# Patient Record
Sex: FEMALE | NOT HISPANIC OR LATINO | Employment: OTHER
[De-identification: names, ages, dates, MRNs, and addresses within clinical notes are randomized per-mention and may not be internally consistent; named-entity substitution may affect disease eponyms.]

---

## 2017-01-16 ENCOUNTER — RECORDS - HEALTHEAST (OUTPATIENT)
Dept: ADMINISTRATIVE | Facility: OTHER | Age: 47
End: 2017-01-16

## 2018-01-07 ENCOUNTER — HEALTH MAINTENANCE LETTER (OUTPATIENT)
Age: 48
End: 2018-01-07

## 2019-07-10 ENCOUNTER — RECORDS - HEALTHEAST (OUTPATIENT)
Dept: ADMINISTRATIVE | Facility: OTHER | Age: 49
End: 2019-07-10

## 2019-08-19 ENCOUNTER — COMMUNICATION - HEALTHEAST (OUTPATIENT)
Dept: FAMILY MEDICINE | Facility: CLINIC | Age: 49
End: 2019-08-19

## 2019-10-14 ENCOUNTER — COMMUNICATION - HEALTHEAST (OUTPATIENT)
Dept: FAMILY MEDICINE | Facility: CLINIC | Age: 49
End: 2019-10-14

## 2019-10-23 ENCOUNTER — COMMUNICATION - HEALTHEAST (OUTPATIENT)
Dept: FAMILY MEDICINE | Facility: CLINIC | Age: 49
End: 2019-10-23

## 2019-11-18 ENCOUNTER — OFFICE VISIT - HEALTHEAST (OUTPATIENT)
Dept: FAMILY MEDICINE | Facility: CLINIC | Age: 49
End: 2019-11-18

## 2019-11-18 ENCOUNTER — COMMUNICATION - HEALTHEAST (OUTPATIENT)
Dept: FAMILY MEDICINE | Facility: CLINIC | Age: 49
End: 2019-11-18

## 2019-11-18 DIAGNOSIS — R79.89 ELEVATED TSH: ICD-10-CM

## 2019-11-18 DIAGNOSIS — Z00.00 ROUTINE GENERAL MEDICAL EXAMINATION AT A HEALTH CARE FACILITY: ICD-10-CM

## 2019-11-18 DIAGNOSIS — Z12.11 SCREEN FOR COLON CANCER: ICD-10-CM

## 2019-11-18 DIAGNOSIS — Z12.31 VISIT FOR SCREENING MAMMOGRAM: ICD-10-CM

## 2019-11-18 LAB — TSH SERPL DL<=0.005 MIU/L-ACNC: 5.1 UIU/ML (ref 0.3–5)

## 2019-11-18 ASSESSMENT — MIFFLIN-ST. JEOR: SCORE: 1432.8

## 2019-11-19 LAB
HPV SOURCE: NORMAL
HUMAN PAPILLOMA VIRUS 16 DNA: NEGATIVE
HUMAN PAPILLOMA VIRUS 18 DNA: NEGATIVE
HUMAN PAPILLOMA VIRUS FINAL DIAGNOSIS: NORMAL
HUMAN PAPILLOMA VIRUS OTHER HR: NEGATIVE
SPECIMEN DESCRIPTION: NORMAL

## 2019-11-20 ENCOUNTER — COMMUNICATION - HEALTHEAST (OUTPATIENT)
Dept: FAMILY MEDICINE | Facility: CLINIC | Age: 49
End: 2019-11-20

## 2019-11-20 ENCOUNTER — AMBULATORY - HEALTHEAST (OUTPATIENT)
Dept: FAMILY MEDICINE | Facility: CLINIC | Age: 49
End: 2019-11-20

## 2019-11-20 DIAGNOSIS — E03.9 HYPOTHYROIDISM, UNSPECIFIED TYPE: ICD-10-CM

## 2019-11-27 ENCOUNTER — COMMUNICATION - HEALTHEAST (OUTPATIENT)
Dept: FAMILY MEDICINE | Facility: CLINIC | Age: 49
End: 2019-11-27

## 2019-11-27 LAB
BKR LAB AP ABNORMAL BLEEDING: NO
BKR LAB AP BIRTH CONTROL/HORMONES: ABNORMAL
BKR LAB AP CERVICAL APPEARANCE: ABNORMAL
BKR LAB AP GYN ADEQUACY: ABNORMAL
BKR LAB AP GYN INTERPRETATION: ABNORMAL
BKR LAB AP HPV REFLEX: ABNORMAL
BKR LAB AP LMP: ABNORMAL
BKR LAB AP PATIENT STATUS: ABNORMAL
BKR LAB AP PREVIOUS ABNORMAL: ABNORMAL
BKR LAB AP PREVIOUS NORMAL: ABNORMAL
HIGH RISK?: NO
PATH REPORT.COMMENTS IMP SPEC: ABNORMAL
RESULT FLAG (HE HISTORICAL CONVERSION): ABNORMAL

## 2019-12-23 ENCOUNTER — RECORDS - HEALTHEAST (OUTPATIENT)
Dept: ADMINISTRATIVE | Facility: OTHER | Age: 49
End: 2019-12-23

## 2019-12-27 ENCOUNTER — COMMUNICATION - HEALTHEAST (OUTPATIENT)
Dept: FAMILY MEDICINE | Facility: CLINIC | Age: 49
End: 2019-12-27

## 2019-12-27 DIAGNOSIS — E03.9 HYPOTHYROIDISM, UNSPECIFIED TYPE: ICD-10-CM

## 2020-01-02 ENCOUNTER — RECORDS - HEALTHEAST (OUTPATIENT)
Dept: ADMINISTRATIVE | Facility: OTHER | Age: 50
End: 2020-01-02

## 2020-01-06 ENCOUNTER — COMMUNICATION - HEALTHEAST (OUTPATIENT)
Dept: FAMILY MEDICINE | Facility: CLINIC | Age: 50
End: 2020-01-06

## 2020-01-06 DIAGNOSIS — E03.9 ACQUIRED HYPOTHYROIDISM: ICD-10-CM

## 2020-01-10 ENCOUNTER — COMMUNICATION - HEALTHEAST (OUTPATIENT)
Dept: FAMILY MEDICINE | Facility: CLINIC | Age: 50
End: 2020-01-10

## 2020-01-10 DIAGNOSIS — E03.9 HYPOTHYROIDISM, UNSPECIFIED TYPE: ICD-10-CM

## 2020-01-12 ENCOUNTER — COMMUNICATION - HEALTHEAST (OUTPATIENT)
Dept: FAMILY MEDICINE | Facility: CLINIC | Age: 50
End: 2020-01-12

## 2020-01-12 DIAGNOSIS — E03.9 HYPOTHYROIDISM, UNSPECIFIED TYPE: ICD-10-CM

## 2020-01-13 ENCOUNTER — AMBULATORY - HEALTHEAST (OUTPATIENT)
Dept: FAMILY MEDICINE | Facility: CLINIC | Age: 50
End: 2020-01-13

## 2020-01-13 DIAGNOSIS — E03.9 HYPOTHYROIDISM, UNSPECIFIED TYPE: ICD-10-CM

## 2020-01-30 ENCOUNTER — COMMUNICATION - HEALTHEAST (OUTPATIENT)
Dept: FAMILY MEDICINE | Facility: CLINIC | Age: 50
End: 2020-01-30

## 2020-02-24 ENCOUNTER — RECORDS - HEALTHEAST (OUTPATIENT)
Dept: ADMINISTRATIVE | Facility: OTHER | Age: 50
End: 2020-02-24

## 2020-02-25 ENCOUNTER — RECORDS - HEALTHEAST (OUTPATIENT)
Dept: ADMINISTRATIVE | Facility: OTHER | Age: 50
End: 2020-02-25

## 2020-03-02 ENCOUNTER — RECORDS - HEALTHEAST (OUTPATIENT)
Dept: ADMINISTRATIVE | Facility: OTHER | Age: 50
End: 2020-03-02

## 2020-03-10 ENCOUNTER — HEALTH MAINTENANCE LETTER (OUTPATIENT)
Age: 50
End: 2020-03-10

## 2020-03-12 ENCOUNTER — AMBULATORY - HEALTHEAST (OUTPATIENT)
Dept: FAMILY MEDICINE | Facility: CLINIC | Age: 50
End: 2020-03-12

## 2020-03-12 DIAGNOSIS — E03.9 HYPOTHYROIDISM, UNSPECIFIED TYPE: ICD-10-CM

## 2020-06-22 ENCOUNTER — COMMUNICATION - HEALTHEAST (OUTPATIENT)
Dept: FAMILY MEDICINE | Facility: CLINIC | Age: 50
End: 2020-06-22

## 2020-07-06 ENCOUNTER — RECORDS - HEALTHEAST (OUTPATIENT)
Dept: ADMINISTRATIVE | Facility: OTHER | Age: 50
End: 2020-07-06

## 2020-07-26 ENCOUNTER — COMMUNICATION - HEALTHEAST (OUTPATIENT)
Dept: FAMILY MEDICINE | Facility: CLINIC | Age: 50
End: 2020-07-26

## 2020-07-27 ENCOUNTER — AMBULATORY - HEALTHEAST (OUTPATIENT)
Dept: FAMILY MEDICINE | Facility: CLINIC | Age: 50
End: 2020-07-27

## 2020-07-27 DIAGNOSIS — T78.40XD ALLERGIC STATE, SUBSEQUENT ENCOUNTER: ICD-10-CM

## 2020-08-18 ENCOUNTER — COMMUNICATION - HEALTHEAST (OUTPATIENT)
Dept: FAMILY MEDICINE | Facility: CLINIC | Age: 50
End: 2020-08-18

## 2020-08-18 DIAGNOSIS — E03.9 ACQUIRED HYPOTHYROIDISM: ICD-10-CM

## 2020-12-27 ENCOUNTER — HEALTH MAINTENANCE LETTER (OUTPATIENT)
Age: 50
End: 2020-12-27

## 2021-04-04 ENCOUNTER — COMMUNICATION - HEALTHEAST (OUTPATIENT)
Dept: FAMILY MEDICINE | Facility: CLINIC | Age: 51
End: 2021-04-04

## 2021-04-04 DIAGNOSIS — E03.9 HYPOTHYROIDISM, UNSPECIFIED TYPE: ICD-10-CM

## 2021-04-07 ENCOUNTER — COMMUNICATION - HEALTHEAST (OUTPATIENT)
Dept: FAMILY MEDICINE | Facility: CLINIC | Age: 51
End: 2021-04-07

## 2021-04-07 DIAGNOSIS — T78.40XD ALLERGY, SUBSEQUENT ENCOUNTER: ICD-10-CM

## 2021-04-09 ENCOUNTER — COMMUNICATION - HEALTHEAST (OUTPATIENT)
Dept: FAMILY MEDICINE | Facility: CLINIC | Age: 51
End: 2021-04-09

## 2021-04-09 ENCOUNTER — OFFICE VISIT - HEALTHEAST (OUTPATIENT)
Dept: FAMILY MEDICINE | Facility: CLINIC | Age: 51
End: 2021-04-09

## 2021-04-09 DIAGNOSIS — E03.9 ACQUIRED HYPOTHYROIDISM: ICD-10-CM

## 2021-04-09 DIAGNOSIS — J30.2 SEASONAL ALLERGIC RHINITIS, UNSPECIFIED TRIGGER: ICD-10-CM

## 2021-04-09 DIAGNOSIS — E03.9 HYPOTHYROIDISM, UNSPECIFIED TYPE: ICD-10-CM

## 2021-04-09 RX ORDER — CETIRIZINE HYDROCHLORIDE, PSEUDOEPHEDRINE HYDROCHLORIDE 5; 120 MG/1; MG/1
1 TABLET, FILM COATED, EXTENDED RELEASE ORAL 2 TIMES DAILY
Qty: 180 TABLET | Refills: 1 | Status: SHIPPED | OUTPATIENT
Start: 2021-04-09 | End: 2021-07-12

## 2021-04-24 ENCOUNTER — HEALTH MAINTENANCE LETTER (OUTPATIENT)
Age: 51
End: 2021-04-24

## 2021-05-01 ENCOUNTER — COMMUNICATION - HEALTHEAST (OUTPATIENT)
Dept: FAMILY MEDICINE | Facility: CLINIC | Age: 51
End: 2021-05-01

## 2021-05-01 DIAGNOSIS — E03.9 HYPOTHYROIDISM, UNSPECIFIED TYPE: ICD-10-CM

## 2021-05-03 RX ORDER — LEVOTHYROXINE SODIUM 75 UG/1
TABLET ORAL
Qty: 30 TABLET | Refills: 0 | Status: SHIPPED | OUTPATIENT
Start: 2021-05-03 | End: 2021-07-29

## 2021-05-31 ENCOUNTER — RECORDS - HEALTHEAST (OUTPATIENT)
Dept: ADMINISTRATIVE | Facility: CLINIC | Age: 51
End: 2021-05-31

## 2021-05-31 NOTE — TELEPHONE ENCOUNTER
This is okay; longstanding patient of mine; we can't go over physical limit in a day, though, thanks

## 2021-05-31 NOTE — TELEPHONE ENCOUNTER
New Appointment Needed  What is the reason for the visit:    The patient would like to get reestablished with Dr. Estes.  The patient needs her yearly physical and has some thyroid concerns.  Provider Preference: PCP only  How soon do you need to be seen?: as soon as possible  Waitlist offered?: No  Okay to leave a detailed message:  Yes

## 2021-05-31 NOTE — TELEPHONE ENCOUNTER
Patient informed of clinician's message. No further questions.   Will call back to make appointment.

## 2021-06-03 NOTE — PROGRESS NOTES
ASSESSMENT:  1. Routine general medical examination at a health care facility     - Gynecologic Cytology (PAP Smear)    2. Visit for screening mammogram   will have completed at local hospital     3. Screen for colon cancer     - Ambulatory referral for Colonoscopy    4. Elevated TSH     - Thyroid Stimulating Hormone (TSH)           PLAN:  There are no Patient Instructions on file for this visit.    Orders Placed This Encounter   Procedures     Thyroid Stimulating Hormone (TSH)     Ambulatory referral for Colonoscopy     Referral Priority:   Routine     Referral Type:   Colonoscopy     Referral Reason:   Evaluation and Treatment     Referral Location:   [facility]     Requested Specialty:   Gastroenterology     Number of Visits Requested:   1     Medications Discontinued During This Encounter   Medication Reason     MULTIVITAMIN WITH MINERALS (HAIR,SKIN AND NAILS ORAL) Therapy completed       Return in about 1 year (around 11/18/2020) for Annual physical.    Health Maintenance Due   Topic Date Due     HIV SCREENING  04/14/1984     ADVANCE CARE PLANNING  04/14/1987     MAMMOGRAM  07/14/2016     PREVENTIVE CARE VISIT  05/20/2017     COLONOSCOPY  04/14/2019     INFLUENZA VACCINE RULE BASED (1) 08/01/2019     ZOSTER VACCINES (1 of 2) 04/14/2019       CHIEF COMPLAINT:  Chief Complaint   Patient presents with     Annual Exam     has labs done - hypothyroidism      Establish Care       HISTORY OF PRESENT ILLNESS:  Melissa D Butenschoen is a 49 y.o. female presenting to the clinic today for a physical exam.     She has not been seen for a few years and is due for a Pap smear.  She has had several Pap smears that were atypical squamous cells of undetermined significance but HPV negative.  She tells me that she had a colposcopy and 2015 maybe 2016 but I do not have those records.  She has had endometrial cells noted but she is also had her period at the time we are doing Pap smears.    She is due for her  "colonoscopies we will put the referral in for that    She is due for a mammogram and she will get that her local hospital.    She had some fasting labs completed through work she brought in a copy of those today and her TSH was elevated.  The labs were drawn July 10, 2019.  She is also complaining of some unexplained weight gain.    She lives with her son who is a cassius in high school.  She is  from his father and they still coparent.    Her review of systems is otherwise unremarkable.    Healthy Habits  Are you taking a daily aspirin? No  Do you typically exercising at least 40 min, 3-4 times per week?  Yes  Do you usually eat at least 4 servings of fruit and vegetables a day, include whole grains and fiber and avoid regularly eating high fat foods? Yes  Have you had an eye exam in the past two years? Yes  Do you see a dentist twice per year? Yes  Do you have any concerns regarding sleep? No  Do you drink caffeine? YES    Safety Screen  If you own firearms, are they secured in a locked gun cabinet or with trigger locks? The patient does not own any firearms    REVIEW OF SYSTEMS:   All other systems are negative.    Immunization History   Administered Date(s) Administered     Hep A, historic 2007, 2010     Hep B, historic 2007, 2007, 2010     Influenza, inj, historic,unspecified 2002     Tdap 2016       GYNECOLOGIC HISTORY:  Last menstrual period: 10/18/19  Contraception: abstinence  Last Pap: 2016 Results were: abnormal  Last mammogram: 2015  Results were: normal    OB History        2    Para   1    Term   1       0    AB   1    Living   1       SAB   1    TAB   0    Ectopic   0    Multiple   0    Live Births               Obstetric Comments   \"Demetris\"             PFSH:     Social History     Tobacco Use   Smoking Status Never Smoker   Smokeless Tobacco Never Used     No family history on file.  Social History     Socioeconomic History     " "Marital status:      Spouse name:       Number of children: 1     Years of education: None     Highest education level: None   Occupational History     Occupation:      Employer: SELF EMPLOYED   Social Needs     Financial resource strain: None     Food insecurity:     Worry: None     Inability: None     Transportation needs:     Medical: None     Non-medical: None   Tobacco Use     Smoking status: Never Smoker     Smokeless tobacco: Never Used   Substance and Sexual Activity     Alcohol use: Yes     Comment: occasional     Drug use: No     Sexual activity: Not Currently   Lifestyle     Physical activity:     Days per week: None     Minutes per session: None     Stress: None   Relationships     Social connections:     Talks on phone: None     Gets together: None     Attends Zoroastrian service: None     Active member of club or organization: None     Attends meetings of clubs or organizations: None     Relationship status: None     Intimate partner violence:     Fear of current or ex partner: None     Emotionally abused: None     Physically abused: None     Forced sexual activity: None   Other Topics Concern     None   Social History Narrative     None     No past surgical history on file.  Allergies   Allergen Reactions     Cat Dander Rash     Dog Dander Rash     Mold Other (See Comments)     Stuffy, running nose     Active Ambulatory Problems     Diagnosis Date Noted     No Active Ambulatory Problems     Resolved Ambulatory Problems     Diagnosis Date Noted     No Resolved Ambulatory Problems     No Additional Past Medical History       VITALS:  Vitals:    11/18/19 1330   BP: 142/82   Pulse: 80   SpO2: 95%   Weight: 188 lb 3.2 oz (85.4 kg)   Height: 5' 3\" (1.6 m)     BP Readings from Last 3 Encounters:   11/18/19 142/82   05/20/16 126/74   04/27/15 122/64     Wt Readings from Last 3 Encounters:   11/18/19 188 lb 3.2 oz (85.4 kg)   05/20/16 170 lb 12.8 oz (77.5 kg)   04/27/15 168 lb (76.2 kg)     Body " mass index is 33.34 kg/m .    PHYSICAL EXAM:  General Appearance: Alert, cooperative, no distress, appears stated age, overweight for height  Head: Normocephalic, without obvious abnormality, atraumatic  Eyes: PERRL, conjunctiva/corneas clear, EOM's intact  Ears: Normal TM's and external ear canals, both ears  Nose: Nares normal, septum midline,mucosa normal, no drainage  Throat: Lips, mucosa, and tongue normal; teeth and gums normal  Neck: Supple, symmetrical, trachea midline, no adenopathy;  thyroid: not enlarged, symmetric, no tenderness/mass/nodules   Back: Symmetric, no curvature, ROM normal, no CVA tenderness  Lungs: Clear to auscultation bilaterally, respirations unlabored  Breasts: No breast masses, tenderness, asymmetry, or nipple discharge.  Heart: Regular rate and rhythm, S1 and S2 normal, no murmur, rub, or gallop, Abdomen: Soft, non-tender, bowel sounds active all four quadrants,  no masses, no organomegaly  Pelvic:Normally developed genitalia with no external lesions or eruptions. Vagina and cervix show no lesions, inflammation, discharge or tenderness. No cystocele, No rectocele. Uterus nontender, but appears to be slightly enlarged to the right.  No adnexal mass or tenderness.    Extremities: Extremities normal, atraumatic, no cyanosis or edema  Skin: Skin color, texture, turgor normal, no rashes or lesions  Lymph nodes: Cervical, supraclavicular, and axillary nodes normal  Neurologic: Normal       QUALITY MEASURES:  The following high BMI interventions were performed this visit: encouragement to exercise, weight monitoring and dietary needs education     MEDICATIONS:  No current outpatient medications on file.     No current facility-administered medications for this visit.

## 2021-06-04 VITALS
OXYGEN SATURATION: 95 % | HEIGHT: 63 IN | WEIGHT: 188.2 LBS | SYSTOLIC BLOOD PRESSURE: 142 MMHG | HEART RATE: 80 BPM | DIASTOLIC BLOOD PRESSURE: 82 MMHG | BODY MASS INDEX: 33.35 KG/M2

## 2021-06-04 NOTE — TELEPHONE ENCOUNTER
RN cannot approve Refill Request    RN can NOT refill this medication PCP to review SIG. Last office visit: Visit date not found Last Physical: 11/18/2019 Last MTM visit: Visit date not found Last visit same specialty: Visit date not found.  Next visit within 3 mo: Visit date not found  Next physical within 3 mo: Visit date not found      Marita Fabian, [facility] Triage/Med Refill 12/28/2019    Requested Prescriptions   Pending Prescriptions Disp Refills     levothyroxine (SYNTHROID, LEVOTHROID) 25 MCG tablet [Pharmacy Med Name: Levothyroxine Sodium Oral Tablet 25 MCG] 60 tablet 0     Sig: take 1 tablet by mouth daily X 14 days, then 2 tablets daily as directed       Thyroid Hormones Protocol Passed - 12/27/2019  8:27 AM        Passed - Provider visit in past 12 months or next 3 months     Last office visit with prescriber/PCP: Visit date not found OR same dept: Visit date not found OR same specialty: Visit date not found  Last physical: 11/18/2019 Last MTM visit: Visit date not found   Next visit within 3 mo: Visit date not found  Next physical within 3 mo: Visit date not found  Prescriber OR PCP: Ema Estes MD  Last diagnosis associated with med order: 1. Hypothyroidism, unspecified type  - levothyroxine (SYNTHROID, LEVOTHROID) 25 MCG tablet [Pharmacy Med Name: Levothyroxine Sodium Oral Tablet 25 MCG]; take 1 tablet by mouth daily X 14 days, then 2 tablets daily as directed  Dispense: 60 tablet; Refill: 0    If protocol passes may refill for 12 months if within 3 months of last provider visit (or a total of 15 months).             Passed - TSH on file in past 12 months for patient age 12 & older     TSH   Date Value Ref Range Status   11/18/2019 5.10 (H) 0.30 - 5.00 uIU/mL Final

## 2021-06-04 NOTE — TELEPHONE ENCOUNTER
Unable to leave voicemail. If patient calls back please relay message below.    Please remind her that she is overdue for lab

## 2021-06-05 NOTE — TELEPHONE ENCOUNTER
Refill Approved    Rx renewed per Medication Renewal Policy. Medication was last renewed on 19.    Galina Patterson, [facility] Connection Triage/Med Refill 1/10/2020     Requested Prescriptions   Pending Prescriptions Disp Refills     levothyroxine (SYNTHROID, LEVOTHROID) 50 MCG tablet 30 tablet 0     Si tablet po daily as directed       Thyroid Hormones Protocol Passed - 1/10/2020  2:36 PM        Passed - Provider visit in past 12 months or next 3 months     Last office visit with prescriber/PCP: Visit date not found OR same dept: Visit date not found OR same specialty: Visit date not found  Last physical: 2019 Last MTM visit: Visit date not found   Next visit within 3 mo: Visit date not found  Next physical within 3 mo: Visit date not found  Prescriber OR PCP: Ema Estes MD  Last diagnosis associated with med order: 1. Hypothyroidism, unspecified type  - levothyroxine (SYNTHROID, LEVOTHROID) 50 MCG tablet; 1 tablet po daily as directed  Dispense: 30 tablet; Refill: 0    If protocol passes may refill for 12 months if within 3 months of last provider visit (or a total of 15 months).             Passed - TSH on file in past 12 months for patient age 12 & older     TSH   Date Value Ref Range Status   2019 5.10 (H) 0.30 - 5.00 uIU/mL Final

## 2021-06-10 NOTE — TELEPHONE ENCOUNTER
Medication Question or Clarification  Who is calling: patient   What medication are you calling about (include dose and sig)?:   levothyroxine (SYNTHROID, LEVOTHROID) 75 MCG tablet  90 tablet  3  3/12/2020      Sig - Route: Take 1 tablet (75 mcg total) by mouth Daily at 6:00 am. - Oral     Sent to pharmacy as: levothyroxine 75 mcg tablet (SYNTHROID, LEVOTHROID)         Who prescribed the medication?: Ema Estes MD    What is your question/concern?: I am on vacation for a week and I forgot my thyroid medication. Please send a 7 day supply to [facility] pharmacy in [place], [place]  Requested Pharmacy: [facility] pharmacy in [place], [place]  Okay to leave a detailed message?: Yes

## 2021-06-15 ENCOUNTER — TRANSFERRED RECORDS (OUTPATIENT)
Dept: HEALTH INFORMATION MANAGEMENT | Facility: CLINIC | Age: 51
End: 2021-06-15

## 2021-06-16 PROBLEM — E03.9 ACQUIRED HYPOTHYROIDISM: Status: ACTIVE | Noted: 2020-01-06

## 2021-06-16 NOTE — TELEPHONE ENCOUNTER
Controlled Substance Refill Request  Medication Name:   Requested Prescriptions     Pending Prescriptions Disp Refills     ZYRTEC-D 5-120 mg per tablet [Pharmacy Med Name: ZyrTEC-D Allergy & Congestion Oral Tablet Extended Release 12 Hour 5-120 MG] 180 tablet 0     Sig: TAKE ONE TABLET BY MOUTH TWICE DAILY     Date Last Fill: 7/27/20  [facility]: [facility]  Submit electronically to pharmacy  Controlled Substance Agreement on file:   Encounter-Level CSA Scan Date:    There are no encounter-level csa scan date.        Last office visit:  11/18/19 7/27/20

## 2021-06-16 NOTE — PROGRESS NOTES
Melissa D Butenschoen is a 50 y.o. female who is being evaluated via a billable telephone visit.      What phone number would you like to be contacted at? [phone]  How would you like to obtain your AVS? AVS Preference: Erin.    Assessment & Plan     Acquired hypothyroidism       Seasonal allergic rhinitis, unspecified trigger     - cetirizine-pseudoephedrine (ZYRTEC-D) 5-120 mg per tablet; Take 1 tablet by mouth 2 (two) times a day.    Hypothyroidism, unspecified type     - levothyroxine (SYNTHROID, LEVOTHROID) 75 MCG tablet; TAKE ONE TABLET BY MOUTH ONE TIME DAILY at 6:00 am     Ema Estes MD  [facility]   Melissa D Butenschoen is 50 y.o. and presents today for the following health issues     She has some pretty significant allergies and she thinks that in the last year when she is been working from home they might have actually gotten worse.  She doesn't know if she is allergic to their dog or what else is going on.  She is had a lot of sneezing.  She needs a prescription of Zyrtec-D so that she can get enough to last otherwise she doesn't have enough for a month    She also had some weight gain and doesn't know if it's the pandemic or decreased activity or that her thyroid isn't adequately replaced and she can't get in to have her physical until July so I will send an order in her chart for fasting labs for lipids and comprehensive metabolic panel also do the TSH and free T4 to see if she is on an appropriate dose of thyroid and a vitamin D level.  When she comes in in July we can talk about these further.    I'll refill her levothyroxine until she comes in.  She hasn't been seen since November 2019 so she is very overdue for any sort of health care but we'll get her a bridging prescription until she can come in.  HPI        Review of Systems         Objective       Vitals:  No vitals were obtained today due to virtual visit.    Physical Exam            Phone call duration: 15 minutes    Ema Estes MD

## 2021-06-16 NOTE — TELEPHONE ENCOUNTER
RN cannot approve Refill Request    RN can NOT refill this medication Protocol failed and NO refill given. Last office visit: Visit date not found Last Physical: 11/18/2019 Last MTM visit: Visit date not found Last visit same specialty: Visit date not found.  Next visit within 3 mo: Visit date not found  Next physical within 3 mo: Visit date not found      Marita Fabian, [facility] Triage/Med Refill 4/4/2021    Requested Prescriptions   Pending Prescriptions Disp Refills     levothyroxine (SYNTHROID, LEVOTHROID) 75 MCG tablet [Pharmacy Med Name: Levothyroxine Sodium Oral Tablet 75 MCG] 90 tablet 0     Sig: TAKE ONE TABLET BY MOUTH ONE TIME DAILY at 6:00 am       Thyroid Hormones Protocol Failed - 4/4/2021 12:06 PM        Failed - Provider visit in past 12 months or next 3 months     Last office visit with prescriber/PCP: Visit date not found OR same dept: Visit date not found OR same specialty: Visit date not found  Last physical: 11/18/2019 Last MTM visit: Visit date not found   Next visit within 3 mo: Visit date not found  Next physical within 3 mo: Visit date not found  Prescriber OR PCP: Ema Estes MD  Last diagnosis associated with med order: 1. Hypothyroidism, unspecified type  - levothyroxine (SYNTHROID, LEVOTHROID) 75 MCG tablet [Pharmacy Med Name: Levothyroxine Sodium Oral Tablet 75 MCG]; TAKE ONE TABLET BY MOUTH ONE TIME DAILY at 6:00 am  Dispense: 90 tablet; Refill: 0    If protocol passes may refill for 12 months if within 3 months of last provider visit (or a total of 15 months).             Failed - TSH on file in past 12 months for patient age 12 & older     TSH   Date Value Ref Range Status   11/18/2019 5.10 (H) 0.30 - 5.00 uIU/mL Final

## 2021-06-17 NOTE — TELEPHONE ENCOUNTER
RN cannot approve Refill Request    RN can NOT refill this medication PCP messaged that patient is overdue for Labs. Last office visit: Visit date not found Last Physical: 11/18/2019 Last MTM visit: Visit date not found Last visit same specialty: Visit date not found.  Next visit within 3 mo: Visit date not found  Next physical within 3 mo: Visit date not found      Haily Cervantes, [facility] Triage/Med Refill 5/1/2021    Requested Prescriptions   Pending Prescriptions Disp Refills     levothyroxine (SYNTHROID, LEVOTHROID) 75 MCG tablet [Pharmacy Med Name: Levothyroxine Sodium Oral Tablet 75 MCG] 30 tablet 0     Sig: TAKE ONE TABLET BY MOUTH ONE TIME DAILY at 6 am       Thyroid Hormones Protocol Failed - 5/1/2021  2:01 AM        Failed - TSH on file in past 12 months for patient age 12 & older     TSH   Date Value Ref Range Status   11/18/2019 5.10 (H) 0.30 - 5.00 uIU/mL Final                   Passed - Provider visit in past 12 months or next 3 months     Last office visit with prescriber/PCP: Visit date not found OR same dept: Visit date not found OR same specialty: Visit date not found  Last physical: 11/18/2019 Last MTM visit: Visit date not found   Next visit within 3 mo: Visit date not found  Next physical within 3 mo: Visit date not found  Prescriber OR PCP: Ema Estes MD  Last diagnosis associated with med order: 1. Hypothyroidism, unspecified type  - levothyroxine (SYNTHROID, LEVOTHROID) 75 MCG tablet [Pharmacy Med Name: Levothyroxine Sodium Oral Tablet 75 MCG]; TAKE ONE TABLET BY MOUTH ONE TIME DAILY at 6 am  Dispense: 30 tablet; Refill: 0    If protocol passes may refill for 12 months if within 3 months of last provider visit (or a total of 15 months).

## 2021-06-19 NOTE — LETTER
Letter by Ema Estes MD at      Author: Ema Estes MD Service: -- Author Type: --    Filed:  Encounter Date: 11/18/2019 Status: Signed         November 18, 2019     Patient: Melissa D Butenschoen   YOB: 1970   Date of Visit: 11/18/2019       To Whom It May Concern:     Melissa Butenschoen needs screening mammogram    If you have any questions or concerns, please don't hesitate to call.    Sincerely,        Electronically signed by Ema Estes MD     Fax results to [phone]

## 2021-06-19 NOTE — LETTER
Letter by Ema Estes MD at      Author: Ema Estes MD Service: -- Author Type: --    Filed:  Encounter Date: 11/20/2019 Status: Signed         November 20, 2019     Patient: Melissa D Butenschoen   YOB: 1970   Date of Visit: 11/20/2019       To Whom It May Concern:    Melissa Butenschoen needs TSH, free T4 and Thyroid antibodies after January 1, 2020 for hypothyroidism    If you have any questions or concerns, please don't hesitate to call.    Sincerely,        Electronically signed by Ema Estes MD     PLEASE FAX RESULTS [phone]

## 2021-06-20 NOTE — LETTER
Letter by Ema Estes MD at      Author: Ema Estes MD Service: -- Author Type: --    Filed:  Encounter Date: 1/30/2020 Status: (Other)         Melissa D Butenschoen  [address]  [place] [address]               January 30, 2020    Dear Nathalie:    Our records indicate that you are due for a mammogram.    In the [place], one in nine women will develop breast cancer during their lifetime. While there is no way to prevent breast cancer, early detection provides the best opportunity for curing it.    For women over the age of 40, the American Cancer Society recommends a yearly clinical breast exam and a yearly mammogram. These practices have saved thousands of lives. We need your help to ensure that you are receiving optimal medical care.    Please make an appointment for a mammogram at your earliest convenience. [phone].    Sincerely,        Ema Estes MD

## 2021-06-20 NOTE — LETTER
Letter by Ema Estes MD at      Author: Ema Estes MD Service: -- Author Type: --    Filed:  Encounter Date: 1/6/2020 Status: Signed         January 6, 2020     Patient: Melissa D Butenschoen   YOB: 1970   Date of Visit: 1/6/2020       To Whom It May Concern:      Melissa Butenschoen  Needs TSH and free T4  Approximately February 13, 2020  Diagnosis E03.9      If you have any questions or concerns, please don't hesitate to call.    Sincerely,        Electronically signed by Ema Estes MD   Please fax results [phone]    Thank you

## 2021-06-21 NOTE — LETTER
Letter by Ema Estes MD at      Author: Ema Estes MD Service: -- Author Type: --    Filed:  Encounter Date: 4/9/2021 Status: (Other)         April 9, 2021     Patient: Melissa D Butenschoen   YOB: 1970   Date of Visit: 4/9/2021       To Whom It May Concern:    It is my medical opinion that Melissa Butenschoen  Needs fasting lab testing    Fasting lipid panel  TSH  Free T4  Comprehensive Metabolic Panel  Vitamin D 25-OH    Dx. Screening for condition Z13.9  Hypothyroidism E03.9  Weight gain  Vitamin D deficiency E 55.9    If you have any questions or concerns, please don't hesitate to call.    Sincerely,        Electronically signed by Ema Estes MD     [phone]

## 2021-07-12 ENCOUNTER — OFFICE VISIT (OUTPATIENT)
Dept: FAMILY MEDICINE | Facility: CLINIC | Age: 51
End: 2021-07-12
Payer: COMMERCIAL

## 2021-07-12 VITALS
SYSTOLIC BLOOD PRESSURE: 124 MMHG | HEIGHT: 63 IN | DIASTOLIC BLOOD PRESSURE: 68 MMHG | HEART RATE: 75 BPM | WEIGHT: 189.6 LBS | BODY MASS INDEX: 33.59 KG/M2

## 2021-07-12 DIAGNOSIS — Z13.9 SCREENING FOR CONDITION: ICD-10-CM

## 2021-07-12 DIAGNOSIS — Z00.00 ROUTINE GENERAL MEDICAL EXAMINATION AT A HEALTH CARE FACILITY: Primary | ICD-10-CM

## 2021-07-12 DIAGNOSIS — Z11.59 NEED FOR HEPATITIS C SCREENING TEST: ICD-10-CM

## 2021-07-12 DIAGNOSIS — E03.9 HYPOTHYROIDISM, UNSPECIFIED TYPE: ICD-10-CM

## 2021-07-12 LAB — TSH SERPL DL<=0.005 MIU/L-ACNC: 2.51 UIU/ML (ref 0.3–5)

## 2021-07-12 PROCEDURE — 84443 ASSAY THYROID STIM HORMONE: CPT | Performed by: FAMILY MEDICINE

## 2021-07-12 PROCEDURE — 86803 HEPATITIS C AB TEST: CPT | Performed by: FAMILY MEDICINE

## 2021-07-12 PROCEDURE — 87624 HPV HI-RISK TYP POOLED RSLT: CPT | Performed by: FAMILY MEDICINE

## 2021-07-12 PROCEDURE — 99396 PREV VISIT EST AGE 40-64: CPT | Performed by: FAMILY MEDICINE

## 2021-07-12 PROCEDURE — 88142 CYTOPATH C/V THIN LAYER: CPT | Performed by: FAMILY MEDICINE

## 2021-07-12 PROCEDURE — 36415 COLL VENOUS BLD VENIPUNCTURE: CPT | Performed by: FAMILY MEDICINE

## 2021-07-12 RX ORDER — ALBUTEROL SULFATE 90 UG/1
AEROSOL, METERED RESPIRATORY (INHALATION)
COMMUNITY
Start: 2021-05-22

## 2021-07-12 ASSESSMENT — MIFFLIN-ST. JEOR: SCORE: 1439.15

## 2021-07-12 NOTE — PROGRESS NOTES
ASSESSMENT:  (Z00.00) Routine general medical examination at a health care facility  (primary encounter diagnosis)  Comment: got card for mammogram  Plan:      (Z11.59) Need for hepatitis C screening test  Comment:  agrees  Plan: Hepatitis C Screen Reflex to HCV RNA Quant and         Genotype             (Z13.9) Screening for condition  Comment:  ASCUS last time  Plan: Pap imaged thin layer diagnostic with HPV         (select HPV order below)             (E03.9) Hypothyroidism, unspecified type  Comment:    Plan: TSH             PLAN:  There are no Patient Instructions on file for this visit.  Patient Active Problem List   Diagnosis     Acquired hypothyroidism         Orders Placed This Encounter   Procedures     REVIEW OF HEALTH MAINTENANCE PROTOCOL ORDERS     Hepatitis C Screen Reflex to HCV RNA Quant and Genotype     TSH     Medications Discontinued During This Encounter   Medication Reason     cetirizine-pseudoephedrine (ZYRTEC-D) 5-120 mg per tablet        Return in about 1 year (around 7/12/2022) for Routine preventive.    Health Maintenance Due   Topic Date Due     ADVANCE CARE PLANNING  Never done     MAMMO SCREENING  Never done     HIV SCREENING  Never done     HEPATITIS C SCREENING  Never done     ZOSTER IMMUNIZATION (1 of 2) Never done     LIPID  05/20/2021       CHIEF COMPLAINT:  chief complaint Annual PE    HISTORY OF PRESENT ILLNESS:  Melissa D Butenschoen is a 51 year old female presenting to the clinic today for a physical exam.     Is not fasting as she had fasting lipids completed at work and she will send me those results.    She needs to have her thyroid checked so we can update her thyroid medication.  She got a card to get her mammogram she will get that scheduled at her local hospital.  Her tetanus is up-to-date and her colonoscopy is up-to-date.    Her last menstrual period was completed last week she is not sexually active so does not need birth control.    2 years ago she had an ASCUS Pap  smear so we will do that again this year.  He was HPV negative.    Healthy Habits  Are you taking a daily aspirin? No  Do you typically exercising at least 40 min, 3-4 times per week?  Yes  Do you usually eat at least 4 servings of fruit and vegetables a day, include whole grains and fiber and avoid regularly eating high fat foods? Yes  Have you had an eye exam in the past two years? Yes  Do you see a dentist twice per year? Yes  Do you have any concerns regarding sleep?No  Do you drink caffeine? Yes    Safety Screen  If you own firearms, are they secured in a locked gun cabinet or with trigger locks?  NO    REVIEW OF SYSTEMS:   All other systems are negative.    Immunization History   Administered Date(s) Administered     COVID-19,PF,Pfizer 04/05/2021, 05/03/2021     Flu, Unspecified 11/21/2002     HepA, Unspecified 09/14/2007, 12/03/2010     HepA-Adult 09/14/2007, 12/03/2010     HepB, Unspecified 09/14/2007, 11/07/2007, 12/03/2010     HepB-Adult 09/14/2007, 11/07/2007, 12/03/2010     Influenza (IIV3) PF 11/21/2002     Influenza Vaccine IM > 6 months Valent IIV4 11/03/2020     Tdap (Adacel,Boostrix) 05/20/2016       GYNECOLOGIC HISTORY:  Last menstrual period: 7/9/21  Contraception: None  Last Pap: 11/19 Results were: abnormal  Last mammogram: last year  Results were: normal         PFSH:     History   Smoking Status     Never Smoker   Smokeless Tobacco     Never Used     Family History   Problem Relation Age of Onset     Heart Disease Father      Prostate Cancer Father      Hydrocephalus Father      Heart Disease Mother      Heart Surgery Brother      Cerebrovascular Disease Brother      Heart Surgery Brother      Social History     Socioeconomic History     Marital status:      Spouse name: Not on file     Number of children: 1     Years of education: Not on file     Highest education level: Not on file   Occupational History     Not on file   Tobacco Use     Smoking status: Never Smoker     Smokeless  "tobacco: Never Used   Substance and Sexual Activity     Alcohol use: Yes     Comment: Alcoholic Drinks/day: occasional     Drug use: No     Sexual activity: Not Currently   Other Topics Concern     Not on file   Social History Narrative     Not on file     Social Determinants of Health     Financial Resource Strain:      Difficulty of Paying Living Expenses:    Food Insecurity:      Worried About Running Out of Food in the Last Year:      Ran Out of Food in the Last Year:    Transportation Needs:      Lack of Transportation (Medical):      Lack of Transportation (Non-Medical):    Physical Activity:      Days of Exercise per Week:      Minutes of Exercise per Session:    Stress:      Feeling of Stress :    Social Connections:      Frequency of Communication with Friends and Family:      Frequency of Social Gatherings with Friends and Family:      Attends [employer] Services:      Active Member of Clubs or Organizations:      Attends Club or Organization Meetings:      Marital Status:    Intimate Partner Violence:      Fear of Current or Ex-Partner:      Emotionally Abused:      Physically Abused:      Sexually Abused:      History reviewed. No pertinent surgical history.  Allergies   Allergen Reactions     Cat Dander [Animal Dander] Rash     Dog Dander [Dog Epithelium] Rash     Mold [Molds & Smuts] Other (See Comments)     Stuffy, running nose     Active Ambulatory Problems     Diagnosis Date Noted     Acquired hypothyroidism 2020     Resolved Ambulatory Problems     Diagnosis Date Noted     No Resolved Ambulatory Problems     Past Medical History:   Diagnosis Date      (normal spontaneous vaginal delivery)      Other specified hypothyroidism        VITALS:  Vitals:    21 0827   BP: 124/68   BP Location: Right arm   Patient Position: Sitting   Cuff Size: Adult Large   Pulse: 75   Weight: 86 kg (189 lb 9.6 oz)   Height: 1.6 m (5' 3\")     BP Readings from Last 3 Encounters:   21 124/68   19 " (!) 142/82     Wt Readings from Last 3 Encounters:   07/12/21 86 kg (189 lb 9.6 oz)   11/18/19 85.4 kg (188 lb 3.2 oz)   05/20/16 77.5 kg (170 lb 12.8 oz)     Body mass index is 33.59 kg/m .    PHYSICAL EXAM:  General Appearance: Alert, cooperative, no distress, appears stated age, overweight for height  Head: Normocephalic, without obvious abnormality, atraumatic  Eyes: PERRL, conjunctiva/corneas clear, EOM's intact  Ears: Normal TM's and external ear canals, both ears  Nose: Nares normal, septum midline,mucosa normal, no drainage  Throat:  Masked, has seen dentiat  Neck: Supple, symmetrical, trachea midline, no adenopathy;  thyroid: not enlarged, symmetric, no tenderness/mass/nodules; no carotid bruit or JVD  Back: Symmetric, no curvature, ROM normal, no CVA tenderness  Lungs: Clear to auscultation bilaterally, respirations unlabored  Breasts: No breast masses, tenderness, asymmetry, or nipple discharge.  Heart: Regular rate and rhythm, S1 and S2 normal, no murmur, rub, or gallop, Abdomen: Soft, non-tender, bowel sounds active all four quadrants,  no masses, no organomegaly  Pelvic: Uterus seems slightly enlarged like there may be a fibroid but her history does not reflect heavy bleeding or clotting.  Extremities: Extremities normal, atraumatic, no cyanosis or edema  Skin: Skin color, texture, turgor normal, no rashes or lesions  Lymph nodes: Cervical, supraclavicular, and axillary nodes normal  Neurologic: Normal      MEDICATIONS:  Current Outpatient Medications   Medication Sig Dispense Refill     levothyroxine (SYNTHROID, LEVOTHROID) 75 MCG tablet [LEVOTHYROXINE (SYNTHROID, LEVOTHROID) 75 MCG TABLET] TAKE ONE TABLET BY MOUTH ONE TIME DAILY at 6 am 30 tablet 0     albuterol (PROAIR HFA/PROVENTIL HFA/VENTOLIN HFA) 108 (90 Base) MCG/ACT inhaler INHALE ONE OR TWO PUFFS BY MOUTH EVERY FOUR HOURS AS NEEDED FOR WHEEZING                    Answers for HPI/ROS submitted by the patient on 7/5/2021  Frequency of  exercise:: 2-3 days/week  Getting at least 3 servings of Calcium per day:: Yes  Diet:: Regular (no restrictions)  Taking medications regularly:: Yes  Medication side effects:: None  Bi-annual eye exam:: NO  Dental care twice a year:: Yes  Sleep apnea or symptoms of sleep apnea:: None  Additional concerns today:: Yes  Duration of exercise:: 30-45 minutes

## 2021-07-14 LAB — HCV AB SERPL QL IA: NONREACTIVE

## 2021-07-16 LAB
HUMAN PAPILLOMA VIRUS 16 DNA: NEGATIVE
HUMAN PAPILLOMA VIRUS 18 DNA: NEGATIVE
HUMAN PAPILLOMA VIRUS FINAL DIAGNOSIS: NORMAL
HUMAN PAPILLOMA VIRUS OTHER HR: NEGATIVE

## 2021-07-19 LAB
BKR LAB AP GYN ADEQUACY: NORMAL
BKR LAB AP GYN INTERPRETATION: NORMAL
BKR LAB AP GYN OTHER FINDINGS: NORMAL
BKR LAB AP HPV REFLEX: NORMAL
BKR LAB AP LMP: NORMAL
BKR LAB AP PREVIOUS ABNL DX: NORMAL
BKR LAB AP PREVIOUS ABNORMAL: NORMAL
PATH REPORT.COMMENTS IMP SPEC: NORMAL
PATH REPORT.RELEVANT HX SPEC: NORMAL

## 2021-07-20 ENCOUNTER — PATIENT OUTREACH (OUTPATIENT)
Dept: FAMILY MEDICINE | Facility: CLINIC | Age: 51
End: 2021-07-20

## 2021-07-21 NOTE — RESULT ENCOUNTER NOTE
Normal pap/Neg HPV letter sent through [facility] results. Next pap smear and HPV due in 1 year.     Karol Fermin, RN, BSN  Pap Tracking Nurse

## 2021-07-29 DIAGNOSIS — E03.9 HYPOTHYROIDISM, UNSPECIFIED TYPE: ICD-10-CM

## 2021-07-29 RX ORDER — CETIRIZINE HYDROCHLORIDE, PSEUDOEPHEDRINE HYDROCHLORIDE 5; 120 MG/1; MG/1
1 TABLET, FILM COATED, EXTENDED RELEASE ORAL
COMMUNITY
Start: 2021-04-09

## 2021-07-29 RX ORDER — LEVOTHYROXINE SODIUM 75 UG/1
TABLET ORAL
Qty: 90 TABLET | Refills: 3 | Status: SHIPPED | OUTPATIENT
Start: 2021-07-29 | End: 2022-07-26

## 2021-10-09 ENCOUNTER — HEALTH MAINTENANCE LETTER (OUTPATIENT)
Age: 51
End: 2021-10-09

## 2022-06-23 ENCOUNTER — PATIENT OUTREACH (OUTPATIENT)
Dept: FAMILY MEDICINE | Facility: CLINIC | Age: 52
End: 2022-06-23

## 2022-06-23 DIAGNOSIS — R87.610 ASCUS OF CERVIX WITH NEGATIVE HIGH RISK HPV: ICD-10-CM

## 2022-06-23 NOTE — LETTER
June 23, 2022      Melissa D Butenschoen  [address] [place] [address]        Dear Ms.Butenschoen,    This letter is to remind you that you are due for your follow-up Pap smear and Human Papillomavirus (HPV) test.    Please call [phone] to schedule your appointment at your earliest convenience.    If you have completed the appointment outside of the [facility] system, please have the records forwarded to our office. We will update your chart for your provider to review before your next annual wellness visit.     Thank you for choosing [facility]!      Sincerely,    Your [facility] Care Team

## 2022-08-15 NOTE — TELEPHONE ENCOUNTER
HI Dr. Estes,   Patient is lost to pap tracking follow-up. Attempts to contact pt have been made per reminder process and there has been no reply and/or no appt scheduled.       Pap Hx:  2004, 2006 NIL paps  12/3/10 ASCUS pap, Neg HPV  3/12/14 LSIL pap  4/27/15 ASCUS pap, Neg HPV  5/20/16 ASCUS, endometrial cells, Neg HPV  11/18/19 ASCUS pap, Neg HPV  7/12/21 NIL pap, endometrial cells, Neg HPV.  Plan: cotest in 1 year  06/23/22 Reminder Mychart, Letter  7/22/22 Reminder call. Left msg  08/15/22 Lost to follow-up for pap tracking, fyi routed to provider

## 2022-09-11 ENCOUNTER — HEALTH MAINTENANCE LETTER (OUTPATIENT)
Age: 52
End: 2022-09-11

## 2022-10-23 DIAGNOSIS — E03.9 HYPOTHYROIDISM, UNSPECIFIED TYPE: ICD-10-CM

## 2022-10-23 RX ORDER — LEVOTHYROXINE SODIUM 75 UG/1
TABLET ORAL
Qty: 90 TABLET | Refills: 0 | Status: CANCELLED | OUTPATIENT
Start: 2022-10-23

## 2022-10-24 NOTE — TELEPHONE ENCOUNTER
When Dr. Linares last refilled, he asked to offer patient a visit with Sierra Adam.  Does patient have anything scheduled?  Is she being seen elsewhere?  She needs a visit scheduled to bridge this prescription.

## 2022-10-24 NOTE — TELEPHONE ENCOUNTER
Routing refill request to provider for review/approval because:  Labs not current:  TSH  Patient needs to be seen because it has been more than 1 year since last office visit.    Yaneli Medina RN

## 2023-01-23 ENCOUNTER — HEALTH MAINTENANCE LETTER (OUTPATIENT)
Age: 53
End: 2023-01-23

## 2023-10-07 ENCOUNTER — HEALTH MAINTENANCE LETTER (OUTPATIENT)
Age: 53
End: 2023-10-07

## 2024-11-30 ENCOUNTER — HEALTH MAINTENANCE LETTER (OUTPATIENT)
Age: 54
End: 2024-11-30

## 2025-02-09 ENCOUNTER — HEALTH MAINTENANCE LETTER (OUTPATIENT)
Age: 55
End: 2025-02-09